# Patient Record
Sex: MALE | Race: WHITE | NOT HISPANIC OR LATINO | URBAN - METROPOLITAN AREA
[De-identification: names, ages, dates, MRNs, and addresses within clinical notes are randomized per-mention and may not be internally consistent; named-entity substitution may affect disease eponyms.]

---

## 2023-12-01 ENCOUNTER — OFFICE VISIT (OUTPATIENT)
Dept: URGENT CARE | Facility: CLINIC | Age: 23
End: 2023-12-01
Payer: COMMERCIAL

## 2023-12-01 ENCOUNTER — HOSPITAL ENCOUNTER (OUTPATIENT)
Dept: RADIOLOGY | Facility: EXTERNAL LOCATION | Age: 23
Discharge: HOME | End: 2023-12-01

## 2023-12-01 VITALS
HEART RATE: 82 BPM | WEIGHT: 135 LBS | SYSTOLIC BLOOD PRESSURE: 120 MMHG | DIASTOLIC BLOOD PRESSURE: 63 MMHG | OXYGEN SATURATION: 98 % | BODY MASS INDEX: 20.53 KG/M2 | RESPIRATION RATE: 14 BRPM | TEMPERATURE: 98.2 F

## 2023-12-01 DIAGNOSIS — S93.491A SPRAIN OF ANTERIOR TALOFIBULAR LIGAMENT OF RIGHT ANKLE, INITIAL ENCOUNTER: ICD-10-CM

## 2023-12-01 DIAGNOSIS — S99.911A ANKLE INJURIES, RIGHT, INITIAL ENCOUNTER: Primary | ICD-10-CM

## 2023-12-01 DIAGNOSIS — S99.911A ANKLE INJURIES, RIGHT, INITIAL ENCOUNTER: ICD-10-CM

## 2023-12-01 PROCEDURE — 1036F TOBACCO NON-USER: CPT | Performed by: FAMILY MEDICINE

## 2023-12-01 PROCEDURE — 99214 OFFICE O/P EST MOD 30 MIN: CPT | Performed by: FAMILY MEDICINE

## 2023-12-01 PROCEDURE — L4350 ANKLE CONTROL ORTHO PRE OTS: HCPCS | Performed by: FAMILY MEDICINE

## 2023-12-01 ASSESSMENT — PAIN SCALES - GENERAL: PAINLEVEL: 4

## 2023-12-01 NOTE — PROGRESS NOTES
Subjective   Patient ID: Cade Cee is a 23 y.o. male.    HPI    The following portions of the chart were reviewed this encounter and updated as appropriate:           Right ankle injury after twisting it earlier today.  Patient locked himself out of his apartment, was jumping into the window to get keys and twisted his ankle.  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  Pain 4/10.  Mild limp.  No similar injury in the past.  No numbness or tingling.  No laceration.  Pain mostly lateral.  No medications taken.  No other complaints or symptoms        Review of Systems  Objective   Physical Exam    Constitutional: vital signs reviewed. Well developed, well nourished. patient alert and patient without distress.   Psych: Normal mood and affect  Skin: Normal skin color and pigmentation, normal skin turgor, and no rash.  Lymphatic: No extremity edema  Cardiovascular: No edema in the extremities. Normal skin color/perfusion.   Pulmonary: Skin without cyanosis. Patient without respiratory distress. Speaking in full sentences.  Musculoskeletal:   Ankle: Right  Edema: Moderate lateral  Tenderness: Lateral malleolus-tender, medial malleoulus-negative, ATF ligament-negative, CF ligament-Negative, metatarsals-negative  Eccyhmosis: negative  Talar tilt test: Positive  Anterior drawer: negative  Strength: 5/5  ROM: normal in all directions  Gait: Antalgic  Sensation: intact  Anglin test: Negative  Squeeze test: Negative  Procedures    Assessment/Plan       Aircast applied by RN and tolerated

## 2023-12-01 NOTE — PATIENT INSTRUCTIONS
Ice can be used for pain relief by placing it on the area pain for 15-20 minutes, 2-3 times per day.   Wear your brace every day, take off at night. When resting at home perform range of motion exercises to prevent stiffness in your ankle.  Avoid exercises or movements that cause pain.  Wear supportive shoes such as sneakers. Avoid wearing sandals or slippers.  You may use over-the-counter anti-inflammatory such as ibuprofen, Advil, Aleve for pain relief. Tylenol can also be used for pain control.  You had x-rays taken. Your x-ray reading is an initial interpretation. It will be further reviewed by a radiologist. If further treatment is necessary, you will be notified by the urgent care.